# Patient Record
Sex: MALE | Race: OTHER | NOT HISPANIC OR LATINO | URBAN - METROPOLITAN AREA
[De-identification: names, ages, dates, MRNs, and addresses within clinical notes are randomized per-mention and may not be internally consistent; named-entity substitution may affect disease eponyms.]

---

## 2020-08-27 ENCOUNTER — NURSE TRIAGE (OUTPATIENT)
Dept: OTHER | Facility: OTHER | Age: 30
End: 2020-08-27

## 2020-08-27 NOTE — TELEPHONE ENCOUNTER
Reason for Disposition   Mild localized rash    Answer Assessment - Initial Assessment Questions  1  APPEARANCE of RASH: "Describe the rash "       Red, lighter spots, raised and bumpy  2  LOCATION: "Where is the rash located?"       Inside (L) elbow  5  ONSET: "When did the rash start?"       8/25  6  ITCHING: "Does the rash itch?" If so, ask: "How bad is the itch?"  (Scale 1-10; or mild, moderate, severe)      Itchy at first  7  PAIN: "Does the rash hurt?" If so, ask: "How bad is the pain?"  (Scale 1-10; or mild, moderate, severe)      Constant little burn to it  8  OTHER SYMPTOMS: "Do you have any other symptoms?" (e g , fever)      denies    Pt states that he never gets rashes, so this is unusual for him  Pt states he has been outside, and could be poison ivy      Protocols used: RASH OR REDNESS - LOCALIZED-ADULT-

## 2020-08-27 NOTE — TELEPHONE ENCOUNTER
Regarding: Rash  ----- Message from Noemi Jones sent at 8/27/2020  7:25 AM EDT -----  "I have a rash "

## 2021-04-08 DIAGNOSIS — Z23 ENCOUNTER FOR IMMUNIZATION: ICD-10-CM
